# Patient Record
Sex: MALE | Race: ASIAN | NOT HISPANIC OR LATINO | Employment: UNEMPLOYED | URBAN - METROPOLITAN AREA
[De-identification: names, ages, dates, MRNs, and addresses within clinical notes are randomized per-mention and may not be internally consistent; named-entity substitution may affect disease eponyms.]

---
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
## 2024-08-31 ENCOUNTER — APPOINTMENT (OUTPATIENT)
Dept: GENERAL RADIOLOGY | Facility: CLINIC | Age: 19
End: 2024-08-31
Attending: EMERGENCY MEDICINE
Payer: COMMERCIAL

## 2024-08-31 ENCOUNTER — HOSPITAL ENCOUNTER (EMERGENCY)
Facility: CLINIC | Age: 19
Discharge: HOME | End: 2024-08-31
Attending: EMERGENCY MEDICINE | Admitting: EMERGENCY MEDICINE
Payer: COMMERCIAL

## 2024-08-31 VITALS
OXYGEN SATURATION: 98 % | HEART RATE: 96 BPM | WEIGHT: 171.74 LBS | TEMPERATURE: 98.1 F | RESPIRATION RATE: 16 BRPM | BODY MASS INDEX: 27.6 KG/M2 | SYSTOLIC BLOOD PRESSURE: 133 MMHG | HEIGHT: 66 IN | DIASTOLIC BLOOD PRESSURE: 84 MMHG

## 2024-08-31 DIAGNOSIS — L40.50 PSORIATIC ARTHRITIS (H): ICD-10-CM

## 2024-08-31 DIAGNOSIS — M54.50 LOW BACK PAIN WITHOUT SCIATICA, UNSPECIFIED BACK PAIN LATERALITY, UNSPECIFIED CHRONICITY: ICD-10-CM

## 2024-08-31 PROCEDURE — 250N000012 HC RX MED GY IP 250 OP 636 PS 637: Performed by: EMERGENCY MEDICINE

## 2024-08-31 PROCEDURE — 72220 X-RAY EXAM SACRUM TAILBONE: CPT

## 2024-08-31 PROCEDURE — 72170 X-RAY EXAM OF PELVIS: CPT

## 2024-08-31 PROCEDURE — 99284 EMERGENCY DEPT VISIT MOD MDM: CPT

## 2024-08-31 PROCEDURE — 250N000013 HC RX MED GY IP 250 OP 250 PS 637: Performed by: EMERGENCY MEDICINE

## 2024-08-31 RX ORDER — METHYLPREDNISOLONE 4 MG
TABLET, DOSE PACK ORAL
Qty: 21 TABLET | Refills: 0 | Status: SHIPPED | OUTPATIENT
Start: 2024-08-31

## 2024-08-31 RX ORDER — DIAZEPAM 2 MG
2 TABLET ORAL ONCE
Status: COMPLETED | OUTPATIENT
Start: 2024-08-31 | End: 2024-08-31

## 2024-08-31 RX ORDER — ACETAMINOPHEN AND CODEINE PHOSPHATE 300; 30 MG/1; MG/1
1 TABLET ORAL EVERY 6 HOURS PRN
Qty: 8 TABLET | Refills: 0 | Status: SHIPPED | OUTPATIENT
Start: 2024-08-31 | End: 2024-09-03

## 2024-08-31 RX ORDER — OXYCODONE AND ACETAMINOPHEN 5; 325 MG/1; MG/1
2 TABLET ORAL ONCE
Status: COMPLETED | OUTPATIENT
Start: 2024-08-31 | End: 2024-08-31

## 2024-08-31 RX ADMIN — OXYCODONE HYDROCHLORIDE AND ACETAMINOPHEN 2 TABLET: 5; 325 TABLET ORAL at 09:04

## 2024-08-31 RX ADMIN — DIAZEPAM 2 MG: 2 TABLET ORAL at 09:04

## 2024-08-31 RX ADMIN — DEXAMETHASONE 6 MG: 2 TABLET ORAL at 09:04

## 2024-08-31 ASSESSMENT — ACTIVITIES OF DAILY LIVING (ADL)
ADLS_ACUITY_SCORE: 35
ADLS_ACUITY_SCORE: 33

## 2024-08-31 ASSESSMENT — COLUMBIA-SUICIDE SEVERITY RATING SCALE - C-SSRS
1. IN THE PAST MONTH, HAVE YOU WISHED YOU WERE DEAD OR WISHED YOU COULD GO TO SLEEP AND NOT WAKE UP?: NO
6. HAVE YOU EVER DONE ANYTHING, STARTED TO DO ANYTHING, OR PREPARED TO DO ANYTHING TO END YOUR LIFE?: NO
2. HAVE YOU ACTUALLY HAD ANY THOUGHTS OF KILLING YOURSELF IN THE PAST MONTH?: NO

## 2024-08-31 NOTE — ED PROVIDER NOTES
"  Emergency Department Note      History of Present Illness     Chief Complaint   Back pain/coccygeal pain  HPI   Efraín Dawn is a 19 year old male who presents to the ER for sharp lower left back pain. Patient reports pain in his tailbone starting 3 days ago that gets worse when bending over or walking. He state that it started when he woke up  and has been getting worse since. Patient endorses feeling fine yesterday but the pain hitting him when he lay down to go to bed. Patient is in no pain when just standing. Efraín was diagnosed with arthritis and psoriasis 2 years ago that started in his hands and took Skyrizi but has been on no medication for a while and is trying to get Tremfya. He has been taking Advil.     Independent Historian   None    Review of External Notes   I reviewe dthe 12/22/22 note regarding    Past Medical History     Medical History and Problem List   Psoriasis   Psoriatic arthritis     Medications   Naproxen   Physical Exam     Patient Vitals for the past 24 hrs:   BP Temp Temp src Pulse Resp SpO2 Height Weight   08/31/24 0813 133/84 98.1  F (36.7  C) Temporal 96 16 98 % 1.676 m (5' 6\") 77.9 kg (171 lb 11.8 oz)     Physical Exam  General:  No respiratory distress    Cardiovascular: Good cap refill.    Respiratory: Breathing non labored.     Musculoskeletal: No tenderness. No bony deformity. Pain with flexion and distension of spine but no focal bony tenderness.     Skin: scaling patches on patient's face, no petechiae     Neurologic: non focal      Psychiatric: Appropriate      Abdominal: no diffuse tenderness or distension.     Diagnostics     Lab Results   Labs Ordered and Resulted from Time of ED Arrival to Time of ED Departure - No data to display    Imaging   XR Sacrum and Coccyx 2 Views   Final Result   IMPRESSION: The sacrum and coccyx are partially obscured by overlying bowel gas on the frontal views. No obvious displaced fracture of the visualized bony pelvis is " identified. Alignment appears normal. The sacroiliac and pubic symphysis joint spaces    appear to be grossly maintained. Please refer to separate report from pelvis radiographs of same date for additional details.          XR Pelvis 1/2 Views   Final Result   IMPRESSION: Signal AP view the pelvis is unremarkable. Nothing to suggest an acute fracture or alignment. Joint spaces are maintained.        Independent Interpretation   Through the patient's x-rays and do not see signs of fracture    ED Course      Medications Administered   Medications   oxyCODONE-acetaminophen (PERCOCET) 5-325 MG per tablet 2 tablet (2 tablets Oral $Given 8/31/24 0904)   diazepam (VALIUM) tablet 2 mg (2 mg Oral $Given 8/31/24 0904)   dexAMETHasone (DECADRON) tablet 6 mg (6 mg Oral $Given 8/31/24 0904)         ED Course   ED Course as of 08/31/24 1356   Sat Aug 31, 2024   0834 I obtained the history and examined the patient as noted above.        Additional Documentation  Patient does not have a current primary care doctor    Medical Decision Making / Diagnosis     CMS Diagnoses: None    MIPS       None    [facility]   Efraín Dawn is a 19 year old male unfortunately has psoriatic arthritis and has not been on medications to suppress this.  There is been no trauma to his back but I did consider occult fracture.  My suspicion for discitis or epidural hematoma was low.  I did consider intra-abdominal causes of kidney stones however he has not had abdominal tenderness here in the ER.  X-rays were negative.  On exam when he moved he had pain.  The patient was started on steroids and pain medication and referred outpatient to a primary care doctor.  I did stress to be develops new symptoms he would need to return but I felt comfortable this is likely psoriatic arthritis or musculoskeletal in nature.  Symptoms to return for discussed there is no signs of neurologic deficit.    Disposition   The patient was discharged.     Diagnosis      ICD-10-CM    1. Low back pain without sciatica, unspecified back pain laterality, unspecified chronicity  M54.50       2. Psoriatic arthritis (H)  L40.50            Discharge Medications   Discharge Medication List as of 8/31/2024 10:34 AM        START taking these medications    Details   acetaminophen-codeine (TYLENOL #3) 300-30 MG per tablet Take 1 tablet by mouth every 6 hours as needed., Disp-8 tablet, R-0, Local Print      methylPREDNISolone (MEDROL DOSEPAK) 4 MG tablet therapy pack Follow Package Directions, Disp-21 tablet, R-0, Local Print               Scribe Disclosure:  I, Srinath Aranda, am serving as a scribe at 10:11 AM on 8/31/2024 to document services personally performed by Hua Frances MD based on my observations and the provider's statements to me.        Hua Frances MD  08/31/24 1484

## 2024-08-31 NOTE — ED NOTES
"Pt. Passed road test, only requiring a little help sitting up. Pt. Was advised to take things slow due to having pain medication that may cause lightheadedness and/or dizziness, but once sitting up shot up rather quickly and felt lightheaded. Despite any lightheadedness, pt. Was able to maintain balance and stay steady on his feet, reporting, \"I don't even feel it anymore.\" Pt. Did endorse pain while sitting on the edge of the bed, but denied pain while laying down, standing, and during road test. Family at bedside, pt. Back in bed, side rails x2, call light within reach, no further needs at this time.  "

## 2024-08-31 NOTE — ED TRIAGE NOTES
Pt here with c/o lower back, left of tail bone x 2 days. Denies recent injury/trauma. CMS intact x4. No loss of bowel/bladder. Pain with ambulation. Denies recent  cough/fever. Hx arthritis. ABC intact. A&Ox4.